# Patient Record
Sex: FEMALE | Race: ASIAN | Employment: STUDENT | URBAN - METROPOLITAN AREA
[De-identification: names, ages, dates, MRNs, and addresses within clinical notes are randomized per-mention and may not be internally consistent; named-entity substitution may affect disease eponyms.]

---
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
## 2026-04-06 ENCOUNTER — APPOINTMENT (OUTPATIENT)
Dept: ALLERGY | Age: 21
End: 2026-04-06

## (undated) DIAGNOSIS — N92.6 IRREGULAR MENSTRUAL CYCLE: ICD-10-CM

## (undated) DIAGNOSIS — Z30.9 ENCOUNTER FOR CONTRACEPTIVE MANAGEMENT, UNSPECIFIED TYPE: ICD-10-CM

## (undated) DIAGNOSIS — N94.6 DYSMENORRHEA: ICD-10-CM

## (undated) NOTE — LETTER
Date: 1/23/2025    Patient Name: Lela Rangel          To Whom it may concern:    This letter has been written at the patient's request. The above patient was seen at [facility] for treatment of a medical condition.    This patient should be excused from attending school on 1/23/25.    The patient may return to school once fever, vomiting, and diarrhea free for 24 hours with out the use of medication.         Sincerely,         JHONATAN Byrd

## (undated) NOTE — LETTER
Date: 2/5/2025    Patient Name: Lela Rangel          To Whom it may concern:    The above patient was seen at [facility] for treatment of a medical condition.    This patient should be excused from attending school for the duration of her visit.            Sincerely,      Fara Hernandez MD

## (undated) NOTE — LETTER
Date: 3/31/2022    Patient Name: Scott Mccain          To Whom it may concern: This letter has been written at the patient's request. The above patient was seen at the [facility] for treatment of a medical condition. This patient should be excused from travel due to illness which started on 3/30/21.     Sincerely,          Ashkan Wong, DO

## (undated) NOTE — LETTER
Date: 10/7/2024    Patient Name: Lela Garciago          To Whom it may concern:    The above patient was seen at [facility] for treatment of a medical condition.    Please excuse her from school for the duration of her visit.        Sincerely,      Fara Hernandez MD